# Patient Record
Sex: MALE | Race: BLACK OR AFRICAN AMERICAN | NOT HISPANIC OR LATINO | Employment: OTHER | URBAN - METROPOLITAN AREA
[De-identification: names, ages, dates, MRNs, and addresses within clinical notes are randomized per-mention and may not be internally consistent; named-entity substitution may affect disease eponyms.]

---

## 2022-09-28 ENCOUNTER — TELEPHONE (OUTPATIENT)
Dept: OPHTHALMOLOGY | Facility: CLINIC | Age: 37
End: 2022-09-28
Payer: MEDICARE

## 2022-09-28 NOTE — TELEPHONE ENCOUNTER
----- Message from Lyndsey Parra sent at 9/28/2022  2:16 PM CDT -----  Regarding: [facility]/ [facility] is calling stating the that physician wants the patient to be seen with ophthalmology and not optometry.  Please contact [facility] at [phone]

## 2022-09-28 NOTE — TELEPHONE ENCOUNTER
----- Message from Pita Gutierrez sent at 9/28/2022  1:29 PM CDT -----  Regarding: speak with office  Contact: fidencio Schofield#[phone] fidencio from  wants to have pt scheduled urgently..

## 2022-09-29 ENCOUNTER — OFFICE VISIT (OUTPATIENT)
Dept: OPTOMETRY | Facility: CLINIC | Age: 37
End: 2022-09-29
Payer: MEDICARE

## 2022-09-29 DIAGNOSIS — H10.13 ALLERGIC CONJUNCTIVITIS OF BOTH EYES: ICD-10-CM

## 2022-09-29 DIAGNOSIS — H11.423 CHEMOSIS OF CONJUNCTIVA OF BOTH EYES: Primary | ICD-10-CM

## 2022-09-29 DIAGNOSIS — H10.33 ACUTE CONJUNCTIVITIS OF BOTH EYES, UNSPECIFIED ACUTE CONJUNCTIVITIS TYPE: ICD-10-CM

## 2022-09-29 DIAGNOSIS — H04.123 DRY EYE SYNDROME OF BOTH EYES: ICD-10-CM

## 2022-09-29 PROCEDURE — 92002 PR EYE EXAM, NEW PATIENT,INTERMED: ICD-10-PCS | Mod: S$GLB,,, | Performed by: OPTOMETRIST

## 2022-09-29 PROCEDURE — 1159F MED LIST DOCD IN RCRD: CPT | Mod: CPTII,S$GLB,, | Performed by: OPTOMETRIST

## 2022-09-29 PROCEDURE — 92002 INTRM OPH EXAM NEW PATIENT: CPT | Mod: S$GLB,,, | Performed by: OPTOMETRIST

## 2022-09-29 PROCEDURE — 99999 PR PBB SHADOW E&M-EST. PATIENT-LVL III: ICD-10-PCS | Mod: PBBFAC,,, | Performed by: OPTOMETRIST

## 2022-09-29 PROCEDURE — 99999 PR PBB SHADOW E&M-EST. PATIENT-LVL III: CPT | Mod: PBBFAC,,, | Performed by: OPTOMETRIST

## 2022-09-29 PROCEDURE — 1159F PR MEDICATION LIST DOCUMENTED IN MEDICAL RECORD: ICD-10-PCS | Mod: CPTII,S$GLB,, | Performed by: OPTOMETRIST

## 2022-09-29 RX ORDER — PREDNISOLONE ACETATE 10 MG/ML
1 SUSPENSION/ DROPS OPHTHALMIC EVERY 4 HOURS
Qty: 5 ML | Refills: 1 | Status: SHIPPED | OUTPATIENT
Start: 2022-09-29 | End: 2023-09-29

## 2022-09-29 RX ORDER — AMLODIPINE BESYLATE 2.5 MG/1
2.5 TABLET ORAL DAILY
COMMUNITY
Start: 2022-09-06

## 2022-09-29 RX ORDER — PREDNISOLONE ACETATE 10 MG/ML
1 SUSPENSION/ DROPS OPHTHALMIC EVERY 4 HOURS
Qty: 5 ML | Refills: 1 | Status: SHIPPED | OUTPATIENT
Start: 2022-09-29 | End: 2022-09-29

## 2022-09-29 NOTE — PROGRESS NOTES
HPI    Pt is brought here by his caregiver due to possible abrasion OU.  Pt is non verbal so chief complaint is being given by his caregiver. When   she came to work on Monday pt was covering his eyes and making painful   noises. He will not open his eyes for her at all. She is not sure what may   have happened. She brought him to the ER and they were unable to access   the pt due to non cooperation. They were given an ointment to use but she   has not uses yet.  Last edited by Felix Gonsalves on 9/29/2022  9:37 AM.        ROS    Negative for: Constitutional, Gastrointestinal, Neurological, Skin,   Genitourinary, Musculoskeletal, HENT, Endocrine, Cardiovascular, Eyes,   Respiratory, Psychiatric, Allergic/Imm, Heme/Lymph  Last edited by Daly Christensen, OD on 9/29/2022  9:58 AM.        Assessment /Plan     For exam results, see Encounter Report.    Chemosis of conjunctiva of both eyes    Acute conjunctivitis of both eyes, unspecified acute conjunctivitis type    Dry eye syndrome of both eyes    Allergic conjunctivitis of both eyes    Other orders  -     prednisoLONE acetate (PRED FORTE) 1 % DrpS; Place 1 drop into both eyes every 4 (four) hours.  Dispense: 5 mL; Refill: 1    MONITOR. ED PT ON ALL EXAM FINDINGS  RX PF QID OU; CONTINUE WITH AT'S. DISCUSSED GEL OPTIONS   RTC 1-2 WEEKS FOR F/U
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
## 2023-06-23 PROBLEM — S01.81XA FACIAL LACERATION: Status: ACTIVE | Noted: 2023-06-23

## 2025-01-02 ENCOUNTER — HOSPITAL ENCOUNTER (EMERGENCY)
Facility: OTHER | Age: 40
Discharge: HOME | End: 2025-01-02
Attending: STUDENT IN AN ORGANIZED HEALTH CARE EDUCATION/TRAINING PROGRAM
Payer: MEDICARE

## 2025-01-02 VITALS
HEIGHT: 70 IN | RESPIRATION RATE: 18 BRPM | DIASTOLIC BLOOD PRESSURE: 70 MMHG | WEIGHT: 180 LBS | SYSTOLIC BLOOD PRESSURE: 133 MMHG | BODY MASS INDEX: 25.77 KG/M2 | OXYGEN SATURATION: 99 % | TEMPERATURE: 98 F | HEART RATE: 100 BPM

## 2025-01-02 DIAGNOSIS — H10.9 BACTERIAL CONJUNCTIVITIS: ICD-10-CM

## 2025-01-02 DIAGNOSIS — H26.9 CATARACT, UNSPECIFIED CATARACT TYPE, UNSPECIFIED LATERALITY: Primary | ICD-10-CM

## 2025-01-02 PROCEDURE — 99283 EMERGENCY DEPT VISIT LOW MDM: CPT

## 2025-01-02 PROCEDURE — 25000003 PHARM REV CODE 250

## 2025-01-02 RX ORDER — PROPARACAINE HYDROCHLORIDE 5 MG/ML
1 SOLUTION/ DROPS OPHTHALMIC
Status: COMPLETED | OUTPATIENT
Start: 2025-01-02 | End: 2025-01-02

## 2025-01-02 RX ORDER — MOXIFLOXACIN 5 MG/ML
1 SOLUTION/ DROPS OPHTHALMIC
Status: COMPLETED | OUTPATIENT
Start: 2025-01-02 | End: 2025-01-02

## 2025-01-02 RX ORDER — LORAZEPAM 1 MG/1
1 TABLET ORAL
Status: COMPLETED | OUTPATIENT
Start: 2025-01-02 | End: 2025-01-02

## 2025-01-02 RX ADMIN — MOXIFLOXACIN 1 DROP: 5 SOLUTION/ DROPS OPHTHALMIC at 03:01

## 2025-01-02 RX ADMIN — LORAZEPAM 1 MG: 1 TABLET ORAL at 01:01

## 2025-01-02 RX ADMIN — PROPARACAINE HYDROCHLORIDE 1 DROP: 5 SOLUTION/ DROPS OPHTHALMIC at 12:01

## 2025-01-02 RX ADMIN — FLUORESCEIN SODIUM 1 EACH: 1 STRIP OPHTHALMIC at 12:01

## 2025-01-02 NOTE — DISCHARGE INSTRUCTIONS
Please continue to use Vigamox eye drops in both eyes every 6 hours for 5 days. Monitor for any worsening symptoms such as swelling, redness, worsening discharge.    I have a sent a referral for Ophthalmology, please expect a call from them within 2-3 business days.

## 2025-01-02 NOTE — ED TRIAGE NOTES
"Pt brought to ED by caregiver with bilateral eye drainage "for a while". Patient is non-verbal at baseline and per caregiver, always keeps his eyes closed. Caregiver denies patient having any other symptoms. Pt currently awake and alert.   "

## 2025-01-02 NOTE — ED PROVIDER NOTES
"Encounter Date: 1/2/2025       History     Chief Complaint   Patient presents with    Eye Problem     Pt's caregiver states he's been having drainage from his eye "for a while". States he's been keeping them closed without opening. Pt nonverbal.      38yo nonverbal autistic male presents with caregiver who states he has been having bilateral green-yellow eye drainage X 9 months. Caregiver states he had been seen for this in the past and had received topical antibiotic drops. She states he also had been given a referral to see Ophtho for his cataracts but never saw them due to medicaid insurance reasons. She states his PCP was attempting to get him in to see a different Ophtho but has not update on any future appointments. Caregiver states pt often rubs his eyes. Caregiver denies any other complaints at this time.     The history is provided by the patient and a caregiver.     Review of patient's allergies indicates:  No Known Allergies  Past Medical History:   Diagnosis Date    Cerebral palsy     Epilepsy     Gastroenteritis     Hypertension     Mental retardation     Psychiatric disorder     Recurrent boils      History reviewed. No pertinent surgical history.  No family history on file.  Social History     Tobacco Use    Smoking status: Never    Smokeless tobacco: Never   Substance Use Topics    Alcohol use: No    Drug use: No     Review of Systems  Unable to perform due to pt nonverbal  Physical Exam     Initial Vitals   BP Pulse Resp Temp SpO2   01/02/25 1114 01/02/25 1122 01/02/25 1114 01/02/25 1114 01/02/25 1122   (!) 148/119 101 16 97.7 °F (36.5 °C) 97 %      MAP       --                Physical Exam    Nursing note and vitals reviewed.  Constitutional: Vital signs are normal. He appears well-developed and well-nourished. He is cooperative.  Non-toxic appearance. He does not appear ill.   HENT:   Head: Normocephalic and atraumatic.   Eyes: Conjunctivae and lids are normal. Pupils are equal, round, and " reactive to light. Lids are everted and swept, no foreign bodies found. Right eye exhibits discharge. Right eye exhibits no chemosis. No foreign body present in the right eye. Left eye exhibits discharge. Left eye exhibits no chemosis. No foreign body present in the left eye. Right conjunctiva is not injected. Right conjunctiva has no hemorrhage. Left conjunctiva is not injected. Left conjunctiva has no hemorrhage.   Slit lamp exam:       The right eye shows no corneal abrasion and no fluorescein uptake.        The left eye shows no corneal abrasion and no fluorescein uptake.   Green-yellow bilateral discharge from eyes.    Bilateral white cloudy patches over iris   Neck: Trachea normal. Neck supple. No stridor present. No tracheal deviation present.   Normal range of motion.  Cardiovascular:  Normal rate and regular rhythm.           Pulmonary/Chest: No respiratory distress.   Abdominal: Abdomen is soft.   Musculoskeletal:      Cervical back: Normal range of motion and neck supple.     Neurological: He is alert and oriented to person, place, and time. GCS eye subscore is 4. GCS verbal subscore is 5. GCS motor subscore is 6.   Skin: Skin is warm, dry and intact. No rash noted.   Psychiatric: He has a normal mood and affect. His speech is normal and behavior is normal. Thought content normal.         ED Course   Procedures  Labs Reviewed - No data to display       Imaging Results    None          Medications   fluorescein ophthalmic strip 1 each (1 each Both Eyes Given 1/2/25 1210)   proparacaine 0.5 % ophthalmic solution 1 drop (1 drop Both Eyes Given by Provider 1/2/25 1211)   LORazepam tablet 1 mg (1 mg Oral Given 1/2/25 1300)   moxifloxacin 0.5 % ophthalmic solution 1 drop (1 drop Both Eyes Given 1/2/25 1537)     Medical Decision Making  Urgent evaluation of a nontoxic afebrile nonverbal autistic male for bilateral eye discharge X 9 months. Vitals wnl. No orbital swelling, proptosis, or limited EOMs. Some  bilateral yellow-green mucous discharge from eyes. No chemosis. Bilateral cataracts visualized. Upon second attempt after ativan , able to apply fluorescein stain which did not reveal significant uptake that would be concerning for abrasion, fb, keratitis.  Unable to check pressure with alfonso-pen or visual acuity due to pt's nonverbal autism and active resistance to being examined. Low concern for orbital compartment syndrome or glaucoma as symptoms seem more infectious in nature. I do not believe pt need's imaging at this time. Will treat with vigamox and send a new referral for Ophthalmology. Caregiver verbalized agreement with this plan.     Differential Diagnosis includes, but is not limited to:  Acute glaucoma, open globe, ocular foreign body, retinal detachment, vitreous hemorrhage, endophthalmitis, traumatic injury, orbital fracture, corneal abrasion/ulcer, retinal vascular occlusion, optic neuritis, periorbital/orbital cellulitis, hyphema, hypopion, iritis, UV keratitis, subconjunctival hemorrhage, conjunctivitis, blepharitis, chalazion/hordeolum, benign vitreous floaters, cataracts    Problems Addressed:  Bacterial conjunctivitis: chronic illness or injury  Cataract, unspecified cataract type, unspecified laterality: chronic illness or injury    Risk  Prescription drug management.               ED Course as of 01/02/25 1625   Thu Jan 02, 2025   1240 Attempted physical exam with nurse and caregiver assistance, but unable to complete due to patient lack of cooperation. Will give ativan and attempt again. [RM]   1415 Re-attempted eye exam. Was able to administer fluorescein in both eyes, no uptake visualized with Wood's lamp. Was unable to perform alfonso-pen for pressure.  Patient does have bilateral cataracts.    [RM]      ED Course User Index  [RM] Svitlana Cat, BETHANY                             Clinical Impression:  Final diagnoses:  [H26.9] Cataract, unspecified cataract type, unspecified laterality  (Primary)  [H10.9] Bacterial conjunctivitis          ED Disposition Condition    Discharge Stable          ED Prescriptions    None       Follow-up Information       Follow up With Specialties Details Why Contact Info    Jamarcus Avelar MD Family Medicine  As needed [address] [facility] [address]  [phone]      [facility] [place] [facility] Emergency Medicine Go to  If symptoms worsen [address] [facility] [address]  [phone]             Svitlana Cat PA-C  01/02/25 1621

## 2025-01-14 ENCOUNTER — TELEPHONE (OUTPATIENT)
Dept: OPTOMETRY | Facility: CLINIC | Age: 40
End: 2025-01-14
Payer: MEDICARE

## 2025-01-14 NOTE — TELEPHONE ENCOUNTER
The patients insurance has no benefits for services at this location. The patient will have to pay out of pocket for this visit. The patient can contact their insurance carrier to locate providers in their network. By proceeding I acknowledge that I have shared this information with the patient.